# Patient Record
Sex: MALE | Race: WHITE | URBAN - METROPOLITAN AREA
[De-identification: names, ages, dates, MRNs, and addresses within clinical notes are randomized per-mention and may not be internally consistent; named-entity substitution may affect disease eponyms.]

---

## 2022-10-24 ENCOUNTER — OFFICE VISIT (OUTPATIENT)
Dept: URBAN - METROPOLITAN AREA CLINIC 50 | Facility: CLINIC | Age: 87
End: 2022-10-24
Payer: MEDICARE

## 2022-10-24 DIAGNOSIS — H43.393 OTHER VITREOUS OPACITIES, BILATERAL: ICD-10-CM

## 2022-10-24 DIAGNOSIS — Z96.1 PRESENCE OF INTRAOCULAR LENS: ICD-10-CM

## 2022-10-24 DIAGNOSIS — H16.143 PUNCTATE KERATITIS, BILATERAL: ICD-10-CM

## 2022-10-24 DIAGNOSIS — H16.223 KERATOCONJUNCTIVITIS SICCA, BILATERAL: ICD-10-CM

## 2022-10-24 DIAGNOSIS — E11.9 DIABETES MELLITUS TYPE 2 WITHOUT MENTION OF COMPLICATION: Primary | ICD-10-CM

## 2022-10-24 PROCEDURE — 99214 OFFICE O/P EST MOD 30 MIN: CPT | Performed by: OPTOMETRIST

## 2022-10-24 ASSESSMENT — INTRAOCULAR PRESSURE
OS: 14
OD: 14

## 2022-10-24 NOTE — IMPRESSION/PLAN
Impression: Keratoconjunctivitis sicca, bilateral: U50.497. Plan: Dry eyes account for the patient's complaints. There is no evidence of permanent changes to the cornea. Explained condition does not have a cure and will need artificial tears for maintenance, recommended to use 4-6 times a day.

## 2022-10-24 NOTE — IMPRESSION/PLAN
Impression: Diabetes mellitus Type 2 without mention of complication: D59.2. Plan: Diabetes type II: No background retinopathy, no signs of neovascularization noted. Discussed ocular and systemic benefits of blood sugar control. Discussed risks of progression. Patient to call if signs are symptoms should arise.

## 2023-04-11 ENCOUNTER — POST-OPERATIVE VISIT (OUTPATIENT)
Dept: URBAN - METROPOLITAN AREA CLINIC 48 | Facility: CLINIC | Age: 88
End: 2023-04-11
Payer: MEDICARE

## 2023-04-11 DIAGNOSIS — Z48.810 ENCOUNTER FOR SURGICAL AFTERCARE FOLLOWING SURGERY ON A SENSE ORGAN: Primary | ICD-10-CM

## 2023-04-11 PROCEDURE — 99024 POSTOP FOLLOW-UP VISIT: CPT | Performed by: OPHTHALMOLOGY

## 2023-04-11 RX ORDER — NEOMYCIN, POLYMYXIN B SULFATES, DEXAMETHASONE 1; 3.5; 1 MG/G; MG/G; [USP'U]/G
OINTMENT OPHTHALMIC
Qty: 3 | Refills: 3 | Status: ACTIVE
Start: 2023-04-11

## 2023-04-11 RX ORDER — MOXIFLOXACIN 5 MG/ML
0.5 % SOLUTION/ DROPS OPHTHALMIC
Qty: 5 | Refills: 1 | Status: ACTIVE
Start: 2023-04-11

## 2023-04-11 RX ORDER — AMOXICILLIN AND CLAVULANATE POTASSIUM 500; 125 MG/1; 1/1
TABLET, FILM COATED ORAL
Qty: 14 | Refills: 0 | Status: ACTIVE
Start: 2023-04-11

## 2023-04-11 RX ORDER — PREDNISOLONE ACETATE 10 MG/ML
1 % SUSPENSION/ DROPS OPHTHALMIC
Qty: 5 | Refills: 1 | Status: ACTIVE
Start: 2023-04-11

## 2023-04-11 ASSESSMENT — INTRAOCULAR PRESSURE: OS: 10

## 2023-04-13 ENCOUNTER — OFFICE VISIT (OUTPATIENT)
Dept: URBAN - METROPOLITAN AREA CLINIC 56 | Facility: LOCATION | Age: 88
End: 2023-04-13
Payer: MEDICARE

## 2023-04-13 DIAGNOSIS — S05.22XD LEFT OCULAR LACERATION W/ LOSS OF INTRAOCULAR TISSUE, SUBSEQUENT ENCOUNTER: Primary | ICD-10-CM

## 2023-04-13 PROCEDURE — 99204 OFFICE O/P NEW MOD 45 MIN: CPT | Performed by: STUDENT IN AN ORGANIZED HEALTH CARE EDUCATION/TRAINING PROGRAM

## 2023-04-13 RX ORDER — HYDROCODONE BITARTRATE AND ACETAMINOPHEN 5; 300 MG/1; MG/1
TABLET ORAL
Qty: 20 | Refills: 0 | Status: ACTIVE
Start: 2023-04-13

## 2023-04-13 NOTE — IMPRESSION/PLAN
Impression: Left ocular laceration w/ loss of intraocular tissue, subsequent encounter: S05.22xD. 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 S/p attempted globe repair. Large scleral/corneal laceration with loss intraocluar lens and iris tissue. Resulting NLP vision. Minimal/mild pain. Discussed option of enucleation versus observation. Discussed low risk of sympathetic ophthalmia and that the risk of SO is not necessarily relieved by removing the eye. Recent literature does not support the 2 week window for eye removal. 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 He understands he can elect to proceed with enucleation in future if eye becomes painful or disfiguring. Patient elects to observe for now. He understands he should call or go to ED immediately if sudden changes. Instructed to continue taking oral augmentin as prescribed. Continue post globe repair eye drops as instructed by surgeon. Return to see oculoplastics prn.

## 2023-04-28 ENCOUNTER — OFFICE VISIT (OUTPATIENT)
Dept: URBAN - METROPOLITAN AREA CLINIC 48 | Facility: CLINIC | Age: 88
End: 2023-04-28
Payer: MEDICARE

## 2023-04-28 DIAGNOSIS — S05.22XD LEFT OCULAR LACERATION W/ LOSS OF INTRAOCULAR TISSUE, SUBSEQUENT ENCOUNTER: Primary | ICD-10-CM

## 2023-04-28 PROCEDURE — 99212 OFFICE O/P EST SF 10 MIN: CPT | Performed by: OPHTHALMOLOGY

## 2023-04-28 RX ORDER — PREDNISOLONE ACETATE 10 MG/ML
1 % SUSPENSION/ DROPS OPHTHALMIC
Qty: 5 | Refills: 1 | Status: ACTIVE
Start: 2023-04-28

## 2023-04-28 ASSESSMENT — INTRAOCULAR PRESSURE
OD: 17
OS: 5

## 2023-04-28 NOTE — IMPRESSION/PLAN
Impression: Left ocular laceration w/ loss of intraocular tissue, subsequent encounter: S05.22xD. 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 

Patient has blood stain on cornea OS


patient to start PF 4-6 times a day OS 


RTC 3 month follow up DE/OCT mac nerve  with Dr. Harry Barbosa or Dr. Misti Mg

## 2023-05-25 ENCOUNTER — OFFICE VISIT (OUTPATIENT)
Dept: URBAN - METROPOLITAN AREA CLINIC 48 | Facility: CLINIC | Age: 88
End: 2023-05-25
Payer: MEDICARE

## 2023-05-25 DIAGNOSIS — H44.439 HYPOTONY OF EYE DUE TO OTHER OCULAR DISORDER: Primary | ICD-10-CM

## 2023-05-25 PROCEDURE — 99024 POSTOP FOLLOW-UP VISIT: CPT | Performed by: OPHTHALMOLOGY

## 2023-05-25 RX ORDER — PREDNISOLONE ACETATE 10 MG/ML
1 % SUSPENSION/ DROPS OPHTHALMIC
Qty: 5 | Refills: 8 | Status: ACTIVE
Start: 2023-05-25

## 2023-05-25 ASSESSMENT — INTRAOCULAR PRESSURE
OD: 18
OS: 2

## 2023-05-25 NOTE — IMPRESSION/PLAN
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 Plan: Discussed the likelihood of OS developing Pthitis Bulbi

IOP low, will increase Pred in hopes it will help IOP increase. Use: Pred 8x daily OS Start: AFT 2-4x daily OU, or PF AFT 3-6x daily OS Patient to call or come back if pain continues, or new problems come up RTC 3-4mo DE/OCT OD, w/ Bscan OS w/ Dr. Trace Ramirez or Dr. Gayle Beaver

## 2023-08-31 ENCOUNTER — OFFICE VISIT (OUTPATIENT)
Dept: URBAN - METROPOLITAN AREA CLINIC 48 | Facility: CLINIC | Age: 88
End: 2023-08-31
Payer: MEDICARE

## 2023-08-31 DIAGNOSIS — H44.439 HYPOTONY OF EYE DUE TO OTHER OCULAR DISORDER: Primary | ICD-10-CM

## 2023-08-31 DIAGNOSIS — H43.811 VITREOUS DEGENERATION, RIGHT EYE: ICD-10-CM

## 2023-08-31 PROCEDURE — 99214 OFFICE O/P EST MOD 30 MIN: CPT | Performed by: OPHTHALMOLOGY

## 2023-08-31 RX ORDER — PREDNISOLONE ACETATE 10 MG/ML
1 % SUSPENSION/ DROPS OPHTHALMIC
Qty: 5 | Refills: 8 | Status: ACTIVE
Start: 2023-08-31

## 2023-08-31 ASSESSMENT — INTRAOCULAR PRESSURE
OS: 8
OD: 16

## 2024-02-29 ENCOUNTER — OFFICE VISIT (OUTPATIENT)
Dept: URBAN - METROPOLITAN AREA CLINIC 48 | Facility: CLINIC | Age: 89
End: 2024-02-29
Payer: MEDICARE

## 2024-02-29 DIAGNOSIS — H43.811 VITREOUS DEGENERATION, RIGHT EYE: Primary | ICD-10-CM

## 2024-02-29 DIAGNOSIS — H44.439 HYPOTONY OF EYE DUE TO OTHER OCULAR DISORDER: ICD-10-CM

## 2024-02-29 PROCEDURE — 99214 OFFICE O/P EST MOD 30 MIN: CPT | Performed by: OPHTHALMOLOGY

## 2024-02-29 ASSESSMENT — INTRAOCULAR PRESSURE
OD: 19
OS: 6

## 2024-09-03 ENCOUNTER — OFFICE VISIT (OUTPATIENT)
Dept: URBAN - METROPOLITAN AREA CLINIC 48 | Facility: CLINIC | Age: 89
End: 2024-09-03
Payer: MEDICARE

## 2024-09-03 DIAGNOSIS — H40.013 OPEN ANGLE WITH BORDERLINE FINDINGS, LOW RISK, BILATERAL: ICD-10-CM

## 2024-09-03 DIAGNOSIS — H26.491 OTHER SECONDARY CATARACT, RIGHT EYE: ICD-10-CM

## 2024-09-03 DIAGNOSIS — E11.9 DIABETES MELLITUS TYPE 2 WITHOUT MENTION OF COMPLICATION: Primary | ICD-10-CM

## 2024-09-03 DIAGNOSIS — H44.439 HYPOTONY OF EYE DUE TO OTHER OCULAR DISORDER: ICD-10-CM

## 2024-09-03 PROCEDURE — 99214 OFFICE O/P EST MOD 30 MIN: CPT | Performed by: OPHTHALMOLOGY

## 2024-09-03 ASSESSMENT — INTRAOCULAR PRESSURE: OD: 21

## 2024-10-25 ENCOUNTER — OFFICE VISIT (OUTPATIENT)
Dept: URBAN - METROPOLITAN AREA CLINIC 48 | Facility: CLINIC | Age: 89
End: 2024-10-25
Payer: MEDICARE

## 2024-10-25 DIAGNOSIS — H40.013 OPEN ANGLE WITH BORDERLINE FINDINGS, LOW RISK, BILATERAL: Primary | ICD-10-CM

## 2024-10-25 PROCEDURE — 76514 ECHO EXAM OF EYE THICKNESS: CPT | Performed by: OPHTHALMOLOGY

## 2024-10-25 PROCEDURE — 99213 OFFICE O/P EST LOW 20 MIN: CPT | Performed by: OPHTHALMOLOGY

## 2024-10-25 PROCEDURE — 92083 EXTENDED VISUAL FIELD XM: CPT | Performed by: OPHTHALMOLOGY

## 2024-10-25 ASSESSMENT — INTRAOCULAR PRESSURE: OD: 18

## 2025-04-14 ENCOUNTER — OFFICE VISIT (OUTPATIENT)
Dept: URBAN - METROPOLITAN AREA CLINIC 50 | Facility: CLINIC | Age: 89
End: 2025-04-14
Payer: MEDICARE

## 2025-04-14 DIAGNOSIS — H44.439 HYPOTONY OF EYE DUE TO OTHER OCULAR DISORDER: ICD-10-CM

## 2025-04-14 DIAGNOSIS — E11.9 TYPE 2 DIABETES MELLITUS W/O COMPLICATION: ICD-10-CM

## 2025-04-14 DIAGNOSIS — H40.013 OPEN ANGLE WITH BORDERLINE FINDINGS, LOW RISK, BILATERAL: Primary | ICD-10-CM

## 2025-04-14 PROCEDURE — 99214 OFFICE O/P EST MOD 30 MIN: CPT | Performed by: OPTOMETRIST

## 2025-04-14 ASSESSMENT — INTRAOCULAR PRESSURE: OD: 18
